# Patient Record
Sex: MALE | Race: WHITE | NOT HISPANIC OR LATINO | Employment: OTHER | URBAN - METROPOLITAN AREA
[De-identification: names, ages, dates, MRNs, and addresses within clinical notes are randomized per-mention and may not be internally consistent; named-entity substitution may affect disease eponyms.]

---

## 2017-03-10 ENCOUNTER — TELEPHONE (OUTPATIENT)
Dept: CARDIOLOGY | Facility: CLINIC | Age: 67
End: 2017-03-10

## 2017-03-10 NOTE — TELEPHONE ENCOUNTER
03/10/17  Noel Ramírez  1950    This is a new patient scheduled to see Dr. Hung on 3/27/17 at the [place] office. Tete with [facility]'s office calls to see if that appt can be moved up with any provider.  There are no new patient appointments available at the [place] office before 3/27/17. They will call somewhere else for sooner appointment.    Margaret ROCHA RN

## 2017-03-27 ENCOUNTER — OFFICE VISIT (OUTPATIENT)
Dept: CARDIOLOGY | Facility: CLINIC | Age: 67
End: 2017-03-27

## 2017-03-27 VITALS
BODY MASS INDEX: 23.8 KG/M2 | HEART RATE: 67 BPM | WEIGHT: 170 LBS | SYSTOLIC BLOOD PRESSURE: 140 MMHG | HEIGHT: 71 IN | DIASTOLIC BLOOD PRESSURE: 72 MMHG

## 2017-03-27 DIAGNOSIS — R06.09 DYSPNEA ON EXERTION: Primary | ICD-10-CM

## 2017-03-27 PROBLEM — I25.84 CORONARY ARTERY CALCIFICATION: Status: ACTIVE | Noted: 2017-03-27

## 2017-03-27 PROBLEM — I25.10 CORONARY ARTERY CALCIFICATION: Status: ACTIVE | Noted: 2017-03-27

## 2017-03-27 PROCEDURE — 99204 OFFICE O/P NEW MOD 45 MIN: CPT | Performed by: INTERNAL MEDICINE

## 2017-03-27 PROCEDURE — 93000 ELECTROCARDIOGRAM COMPLETE: CPT | Performed by: INTERNAL MEDICINE

## 2017-03-27 RX ORDER — ASPIRIN 325 MG
325 TABLET, DELAYED RELEASE (ENTERIC COATED) ORAL AS NEEDED
COMMUNITY

## 2017-03-27 NOTE — PROGRESS NOTES
Noel HOWE Darrell  1950  Date of Office Visit: 03/27/2017  Encounter Provider: Sagar Hung MD  Place of Service: [facility] CARDIOLOGY      CHIEF COMPLAINT:  CASTRO  Coronary artery calcification      HISTORY OF PRESENT ILLNESS: Dr. Mc:      I had the pleasure of seeing your patient in consultation today.  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  Per the patients report, he had mild dyspnea prior to starting the therapy and stated, since starting his BCG therapy, that his dyspnea with minimal levels of exertion has increased.  When he walks greater than 100 feet, he has dyspnea.  He can push through it for a while but then eventually has to stop.  He denies any chest pain or pressure associated with that.  He has no shortness of breath at rest or evidence of orthopnea.  He had evaluation showing a positive D-dimer and follows this up with a CTA of the chest showing evidence of paraseptal emphysema and scarring at the apices along with atelectasis and scarring in the right lower lung.  He had no evidence of pulmonary embolus.  He did have calcification that was mild of his coronary arteries along with aorta.          Review of Systems   Constitution: Positive for malaise/fatigue. Negative for fever and weakness.   HENT: Negative for nosebleeds and sore throat.    Eyes: Negative for blurred vision and double vision.   Cardiovascular: Negative for chest pain, claudication, palpitations and syncope.   Respiratory: Negative for cough, shortness of breath and snoring.    Endocrine: Negative for cold intolerance, heat intolerance and polydipsia.   Skin: Negative  for itching, poor wound healing and rash.   Musculoskeletal: Positive for joint pain. Negative for joint swelling, muscle weakness and myalgias.   Gastrointestinal: Positive for diarrhea. Negative for abdominal pain, melena, nausea and vomiting.   Neurological: Negative for light-headedness, loss of balance, seizures and vertigo.   Psychiatric/Behavioral: Negative for altered mental status and depression.          Past Medical History:   Diagnosis Date   • Abdominal pain    • Bladder cancer    • Cholelithiasis    • Congenital cervical spine stenosis    • Dyspnea    • IBS (irritable bowel syndrome)    • Prostate cancer    • Pulmonary embolism        The following portions of the patient's history were reviewed and updated as appropriate: Social history , Family history and Surgical history     Current Outpatient Prescriptions on File Prior to Visit   Medication Sig Dispense Refill   • aspirin 81 MG EC tablet Take 81 mg by mouth Daily.     • Multiple Vitamins-Minerals (MULTIVITAMIN PO) Take  by mouth.       No current facility-administered medications on file prior to visit.        Allergies   Allergen Reactions   • Ciprofloxacin        There were no vitals filed for this visit.  Physical Exam   Constitutional: He is oriented to person, place, and time. He appears well-developed and well-nourished.   HENT:   Head: Normocephalic and atraumatic.   Eyes: Conjunctivae and EOM are normal. No scleral icterus.   Neck: Normal range of motion. Neck supple. Normal carotid pulses, no hepatojugular reflux and no JVD present. Carotid bruit is not present. No tracheal deviation present. No thyromegaly present.   Cardiovascular: Normal rate and regular rhythm.  Exam reveals no gallop and no friction rub.    No murmur heard.  Pulses:       Carotid pulses are 2+ on the right side, and 2+ on the left side.       Radial pulses are 2+ on the right side, and 2+ on the left side.        Femoral pulses are 2+ on the right side, and 2+ on  the left side.       Dorsalis pedis pulses are 2+ on the right side, and 2+ on the left side.        Posterior tibial pulses are 2+ on the right side, and 2+ on the left side.   Pulmonary/Chest: Breath sounds normal. No respiratory distress. He has no decreased breath sounds. He has no wheezes. He has no rhonchi. He has no rales. He exhibits no tenderness.   Abdominal: Soft. Bowel sounds are normal. He exhibits no distension. There is no tenderness. There is no rebound.   Musculoskeletal: He exhibits no edema or deformity.   Neurological: He is alert and oriented to person, place, and time. He has normal strength. No sensory deficit.   Skin: No rash noted. No erythema.   Psychiatric: He has a normal mood and affect. His behavior is normal.       No components found for: CBC  No results found for: CMP  No components found for: LIPID  No results found for: BMP      ECG 12 Lead  Date/Time: 3/27/2017 1:33 PM  Performed by: GERBER SOLORZANO  Authorized by: GERBER SOLORZANO   Comparison: not compared with previous ECG   Rhythm: sinus rhythm  Rate: normal  QRS axis: normal  Clinical impression: normal ECG               DISCUSSION/SUMMARY The patient is a very pleasant, 66-year-old gentleman with no significant history previously of coronary artery disease but a history of pulmonary embolus, emphysema with scarring also documented on his CTA with dyspnea that is increased in intensity over the past month in duration.  He states that he wonders if his dyspnea is secondary to age along with BCG therapy.      He has no evidence of angina and clinically has no evidence of heart failure.      1. Dyspnea on exertion.  In the setting of his prior tobacco abuse and coronary calcification on CTA, I do think that getting him set up for a stress test would be appropriate.  I will order a myocardial perfusion stress test with exercise initially and, if he cannot reach peak heart rates, move over to Lexiscan.     2. Transthoracic echocardiogram has also been ordered to evaluate valvular disease.  He has no evidence of volume overload on examination today.      I will plan on seeing him back in six months.  We will discuss his findings after his tests are done.

## 2017-04-24 ENCOUNTER — TELEPHONE (OUTPATIENT)
Dept: CARDIOLOGY | Facility: CLINIC | Age: 67
End: 2017-04-24